# Patient Record
Sex: MALE | Race: BLACK OR AFRICAN AMERICAN
[De-identification: names, ages, dates, MRNs, and addresses within clinical notes are randomized per-mention and may not be internally consistent; named-entity substitution may affect disease eponyms.]

---

## 2017-02-20 ENCOUNTER — HOSPITAL ENCOUNTER (EMERGENCY)
Dept: HOSPITAL 68 - ERH | Age: 42
End: 2017-02-20
Payer: COMMERCIAL

## 2017-02-20 VITALS — DIASTOLIC BLOOD PRESSURE: 80 MMHG | SYSTOLIC BLOOD PRESSURE: 160 MMHG

## 2017-02-20 VITALS — WEIGHT: 250 LBS | BODY MASS INDEX: 35.79 KG/M2 | HEIGHT: 70 IN

## 2017-02-20 DIAGNOSIS — I10: ICD-10-CM

## 2017-02-20 DIAGNOSIS — R51: ICD-10-CM

## 2017-02-20 DIAGNOSIS — H60.91: Primary | ICD-10-CM

## 2017-02-20 DIAGNOSIS — Z72.0: ICD-10-CM

## 2017-02-20 LAB
ABSOLUTE GRANULOCYTE CT: 8.9 /CUMM (ref 1.4–6.5)
BASOPHILS # BLD: 0 /CUMM (ref 0–0.2)
BASOPHILS NFR BLD: 0.2 % (ref 0–2)
EOSINOPHIL # BLD: 0.2 /CUMM (ref 0–0.7)
EOSINOPHIL NFR BLD: 2 % (ref 0–5)
ERYTHROCYTE [DISTWIDTH] IN BLOOD BY AUTOMATED COUNT: 13.9 % (ref 11.5–14.5)
GRANULOCYTES NFR BLD: 73 % (ref 42.2–75.2)
HCT VFR BLD CALC: 45.6 % (ref 42–52)
LYMPHOCYTES # BLD: 2.5 /CUMM (ref 1.2–3.4)
MCH RBC QN AUTO: 26.2 PG (ref 27–31)
MCHC RBC AUTO-ENTMCNC: 33.3 G/DL (ref 33–37)
MCV RBC AUTO: 78.8 FL (ref 80–94)
MONOCYTES # BLD: 0.5 /CUMM (ref 0.1–0.6)
PLATELET # BLD: 346 /CUMM (ref 130–400)
PMV BLD AUTO: 7.6 FL (ref 7.4–10.4)
RED BLOOD CELL CT: 5.78 /CUMM (ref 4.7–6.1)
WBC # BLD AUTO: 12.2 /CUMM (ref 4.8–10.8)

## 2017-02-20 NOTE — CT SCAN REPORT
EXAMINATION:
CT HEAD WITHOUT CONTRAST
 
CLINICAL INFORMATION:
Headache, hypertension
 
COMPARISON:
None
 
TECHNIQUE:
Contiguous axial imaging was performed from the skull base to vertex without
intravenous administration of contrast.
 
DLP:
672 mGy-cm
 
FINDINGS:
There is no evidence of acute intracranial hemorrhage or territorial
infarction. No abnormal mass effect or midline shift is seen. Gray to white
matter differentiation is well preserved. No extra-axial fluid collections
are identified.
 
The ventricles are normal in size. There is no abnormal attenuation within
the brain parenchyma. The osseous structures and soft tissues are normal.
Partial opacification of the right ethmoid sinuses.
 
IMPRESSION:
No acute intracranial pathology. Partial opacification of the right ethmoid
sinus.

## 2017-02-20 NOTE — ED GENERAL ADULT
History of Present Illness
 
General
Chief Complaint: General Adult
Stated Complaint: ELAVATED BP
Source: patient
Exam Limitations: no limitations
 
Vital Signs & Intake/Output
Vital Signs & Intake/Output
 Vital Signs
 
 
Date Time Temp Pulse Resp B/P Pulse O2 O2 Flow FiO2
 
     Ox Delivery Rate 
 
 1653    160/80    
 
 1624    202/122    
 
 1600 99.0 103 20 198/116    
 
 1555    198/116    
 
 1516  103 20 200/110 97 Room Air  
 
 1436 99.0 97 20 198/122    
 
 1418    198/122    
 
 1324 99.0 97 20 196/135 97 Room Air  
 
 
Allergies
Coded Allergies:
ibuprofen (From MOTRIN) (Intermediate, HIVES 16)
 
Reconcile Medications
Albuterol Sulfate (Proair Hfa) 90 MCG HFA.AER.AD   2 PUF INH Q4-6 PRN PRN 
SHORTNESS OF BREATH  (Reported)
Ciprofloxacin HCl/Dexameth (Ciprodex Otic Suspension) 0.3 %-0.1 % DROPS.SUSP   4
GTT OT BID otitis externa
     use for 10 days
Hydrochlorothiazide 25 MG TABLET   1 TAB PO DAILY BP  (Reported)
Labetalol HCl 100 MG TABLET   1 TAB PO BID hypertension
Oxycodone HCl/Acetaminophen (Percocet 5-325 MG Tablet) 5 MG-325 MG TABLET   1 
TAB PO Q6H PRN PAIN
 
Triage Note:
WENT TO WALK IN CLINIC FOR EAR INFECTION AND SENT
 HERE FOR BP /180. PT STATES + H/A. PT TAKES
 HCTZ FOR BP AND TOOK MED TODAY
Triage Nurses Notes Reviewed? yes
HPI:
Patient is a 42 year old male presents for evaluation of elevated blood 
pressure.  Patient was at an urgent care clinic for evaluation of right ear pain
and they obtained a blood pressure of 209/180.  Patient takes 
hydrochlorothiazide for his blood pressure, has been compliant with his 
medication regimen.  Headache and ear pain since yesterday.  Patient also noted 
drainage from his right ear over the past 24 hours.  Patient took Benadryl 
yesterday evening to help him sleep with minimal improvement.  Patient has not 
taken any medication for his pain.  Headache and ear pain is currently severe.  
Patient denies blurred vision, chest pain, dyspnea nausea, vomiting, fevers, 
chills.
(ODALYS DONG,KISHAN)
 
Past History
 
Travel History
Traveled to [place] past 21 day No
 
Medical History
Any Pertinent Medical History? see below for history
Neurological: NONE
EENT: NONE
Cardiovascular: hypertension
Respiratory: asthma
Gastrointestinal: NONE
Hepatic: NONE
Renal: NONE
Musculoskeletal: NECK LYPOMA
Psychiatric: NONE
Endocrine: NONE
 
Surgical History
Surgical History: non-contributory
 
Psychosocial History
What is your primary language English
Tobacco Use: Current Daily Use
Daily Tobacco Use Amount/Type: => 5 Cigarettes daily
ETOH Use: occasional use
Illicit Drug Use: denies illicit drug use
 
Family History
Hx Contributory? No
(KISHAN COLBY)
 
Review of Systems
 
Review of Systems
Constitutional:
Denies: chills, fever. 
EENTM:
Reports: ear pain, nasal congestion.  Denies: blurred vision, visual changes. 
Respiratory:
Denies: cough, short of breath. 
Cardiovascular:
Denies: chest pain, peripheral edema, syncope. 
GI:
Denies: abdominal pain, nausea, vomiting. 
Genitourinary:
Reports: no symptoms. 
Musculoskeletal:
Reports: no symptoms. 
Skin:
Reports: no symptoms. 
Neurological/Psychological:
Reports: headache.  Denies: numbness. 
Hematologic/Endocrine:
Denies: bruising, bleeding. 
Immunologic/Allergic:
Denies: splenectomy. 
(KISHAN COLBY)
 
Physical Exam
 
Physical Exam
General Appearance: well developed/nourished, alert, awake
Head: atraumatic, normal appearance
Eyes:
Bilateral: normal appearance, PERRL, EOMI, other (grossly normal fundoscope exam
). 
Ears, Nose, Throat: inflammation of the right EAC.  Right tragus tenderness
Neck: normal inspection, supple, full range of motion
Respiratory: normal breath sounds, chest non-tender, no respiratory distress, 
lungs clear
Cardiovascular: regular rate/rhythm (no appreciable murmur)
Gastrointestinal: soft, non-tender
Back: normal inspection, normal range of motion
Extremities: normal inspection, normal capillary refill, normal range of motion,
no edema
Neurologic/Psych: no motor/sensory deficits, awake, alert, oriented x 3, normal 
gait, CNs II-XII nml as tested
Skin: intact, normal color, warm/dry
Lymphatic: no anterior cervical gallo
 
Core Measures
ACS in differential dx? Yes
ASA ordered for poss ACS? No-ACS ruled out
CVA/TIA Diagnosis: No
Severe Sepsis Present: No
Septic Shock Present: No
(KISHAN COLBY)
 
Progress
Differential Diagnoses
I considered the following diagnoses in my evaluation of the patient: Sinusitis,
otitis media, otitis externa, intracranial bleed, intracranial infection, 
hypertensive urgency, hypertensive crisis
 
Plan of Care:
 Orders
 
 
Procedure Date/time Status
 
Telemetry/Cardiac Monitor 1423 Active
 
TROPONIN LEVEL 1423 Complete
 
COMPREHENSIVE METABOLIC PANEL 1423 Complete
 
CBC WITHOUT DIFFERENTIAL 1423 Complete
 
EKG  140 Active
 
 
 Laboratory Tests
 
 
 
17 1425:
Anion Gap 10, Estimated GFR > 60, BUN/Creatinine Ratio 7.5, Glucose 111  H, 
Calcium 9.2, Total Bilirubin 0.5, AST 24, ALT 39, Alkaline Phosphatase 90, 
Troponin I < 0.01, Total Protein 7.7, Albumin 4.2, Globulin 3.5, Albumin/
Globulin Ratio 1.2, CBC w Diff NO MAN DIFF REQ, RBC 5.78, MCV 78.8  L, MCH 26.2 
L, RDW 13.9, MPV 7.6, Gran % 73.0, Lymphocytes % 20.7, Monocytes % 4.1, 
Eosinophils % 2.0, Basophils % 0.2, Absolute Granulocytes 8.9  H, Absolute 
Lymphocytes 2.5, Absolute Monocytes 0.5, Absolute Eosinophils 0.2, Absolute 
Basophils 0, PUBS MCHC 33.3
2017 4:28:20 PM: Patient's blood pressure did not significantly improve 
after amlodipine.  IV labetalol ordered.  Discussed with Dr. Wagner, EKG and labs
reviewed: If blood pressure improves to less than 200/100 then can discharge 
home on additional PO medication.
 
 
Blood pressure improving.  No acute findings of end organ dysfunction on labs or
exam.  Appears stable for discharge with close outpatient follow up.
(ODALYS DONG,KISHAN)
Diagnostic Imaging:
Viewed by Me: CT Scan.  Discussed w/RAD: CT Scan. 
Radiology Impression: PATIENT: GERRY DOVE  MEDICAL RECORD NO: [id number] PRESENT
AGE: 42  PATIENT ACCOUNT NO: [id number] : 77  LOCATION: [facility] ORDERING 
PHYSICIAN: KISHAN DONG     SERVICE DATE: 17- EXAM TYPE: CAT -
CT HEAD WO IV CONTRAST EXAMINATION: CT HEAD WITHOUT CONTRAST CLINICAL 
INFORMATION: Headache, hypertension COMPARISON: None TECHNIQUE: Contiguous axial
imaging was performed from the skull base to vertex without intravenous 
administration of contrast. DLP: 672 mGy-cm FINDINGS: There is no evidence of 
acute intracranial hemorrhage or territorial infarction. No abnormal mass effect
or midline shift is seen. Gray to white matter differentiation is well 
preserved. No extra-axial fluid collections are identified. The ventricles are 
normal in size. There is no abnormal attenuation within the brain parenchyma. 
The osseous structures and soft tissues are normal. Partial opacification of the
right ethmoid sinuses. IMPRESSION: No acute intracranial pathology. Partial 
opacification of the right ethmoid sinus. DICTATED BY: MADELINE LINDSAY MD  
DATE/TIME DICTATED:17 :RAD.PEREZ  DATE/TIME 
TRANSCRIBED:17 CONFIDENTIAL, DO NOT COPY WITHOUT APPROPRIATE 
AUTHORIZATION.  <Electronically signed in Other Vendor System>                  
                                                                     SIGNED BY: 
MADELINE LINDSAY MD 17 1523
Initial ED EKG: normal axis, normal intervals, normal p-waves, normal QRS 
complex, normal sinus rhythm, no ST T wave changes
(KISHAN COLBY)
 
Departure
 
Departure
Time of Disposition: 
Disposition: HOME OR SELF CARE
Condition: Stable
Clinical Impression
Primary Impression: Otitis externa
Qualifiers:  Otitis externa type: unspecified type Laterality: right Chronicity:
acute Qualified Code: H60.501 - Unspecified acute noninfective otitis externa, 
right ear
Secondary Impressions: 
Hypertension
Qualifiers:  Hypertension type: essential hypertension Qualified Code: I10 - 
Essential (primary) hypertension
 
Referrals:
YAZAN MATAMOROS,CLIVE MONTERROSO (PCP/Family)
 
Additional Instructions:
Follow up with your primary doctor this week for further evaluation.  Call in 
the morning for appointment.  Return to the emergency department if he developed
chest pain, difficulty breathing, change in vision, numbness, weakness, or 
worsening of symptoms.
Departure Forms:
Customer Survey
General Discharge Information
Prescriptions:
Current Visit Scripts
Oxycodone HCl/Acetaminophen (Percocet 5-325 MG Tablet) 1 TAB PO Q6H PRN PAIN
     #10 TAB 
 
Ciprofloxacin HCl/Dexameth (Ciprodex Otic Suspension) 4 GTT OT BID 
     #1 BOT 
     use for 10 days
 
Labetalol HCl 1 TAB PO BID 
     #60 TAB 
 
 
(KISHAN COLBY)
 
PA/NP Co-Sign Statement
Statement:
ED Attending supervision documentation-
 
[] I saw and evaluated the patient. I have also reviewed all the pertinent lab 
results and diagnostic results. I agree with the findings and the plan of care 
as documented in the PA's/NP's documentation. 
 
[x] I have reviewed the ED Record and agree with the PA's/NP's documentation.
 
[] Additions or exceptions (if any) to the PAs/NP's note and plan are 
summarized below:
[]
 
(MONICA WAGNER DO)
 
Critical Care Note
 
Critical Care Note
Critical Care Time: non-applicable
(ODALYS DONG,KISHAN)

## 2018-08-09 ENCOUNTER — HOSPITAL ENCOUNTER (EMERGENCY)
Dept: HOSPITAL 68 - ERH | Age: 43
Discharge: LEFT BEFORE DISCHARGE | End: 2018-08-09
Payer: COMMERCIAL

## 2018-08-09 VITALS — BODY MASS INDEX: 30.92 KG/M2 | HEIGHT: 70 IN | WEIGHT: 216 LBS

## 2018-08-09 VITALS — SYSTOLIC BLOOD PRESSURE: 135 MMHG | DIASTOLIC BLOOD PRESSURE: 75 MMHG

## 2018-08-09 DIAGNOSIS — M79.601: Primary | ICD-10-CM

## 2018-08-09 LAB
ABSOLUTE GRANULOCYTE CT: 5.1 /CUMM (ref 1.4–6.5)
BASOPHILS # BLD: 0 /CUMM (ref 0–0.2)
BASOPHILS NFR BLD: 0.4 % (ref 0–2)
EOSINOPHIL # BLD: 0.4 /CUMM (ref 0–0.7)
EOSINOPHIL NFR BLD: 4.2 % (ref 0–5)
ERYTHROCYTE [DISTWIDTH] IN BLOOD BY AUTOMATED COUNT: 15.2 % (ref 11.5–14.5)
GRANULOCYTES NFR BLD: 59.2 % (ref 42.2–75.2)
HCT VFR BLD CALC: 38.9 % (ref 42–52)
LYMPHOCYTES # BLD: 2.7 /CUMM (ref 1.2–3.4)
MCH RBC QN AUTO: 26.9 PG (ref 27–31)
MCHC RBC AUTO-ENTMCNC: 33.1 G/DL (ref 33–37)
MCV RBC AUTO: 81.1 FL (ref 80–94)
MONOCYTES # BLD: 0.5 /CUMM (ref 0.1–0.6)
PLATELET # BLD: 362 /CUMM (ref 130–400)
PMV BLD AUTO: 7.3 FL (ref 7.4–10.4)
RED BLOOD CELL CT: 4.8 /CUMM (ref 4.7–6.1)
WBC # BLD AUTO: 8.6 /CUMM (ref 4.8–10.8)

## 2018-08-09 NOTE — RADIOLOGY REPORT
EXAMINATION:
XR PORTABLE CHEST
 
CLINICAL INFORMATION:
Chest pain
 
COMPARISON:
6/6/2018
 
TECHNIQUE:
Portable frontal view of the chest was obtained.
 
FINDINGS:
The lungs are well expanded. There is no focal consolidation, edema, or
effusion. No pneumothorax. The cardiomediastinal silhouette is within normal
limits. No acute osseous abnormality.
 
IMPRESSION:
No acute pulmonary findings.

## 2021-07-27 NOTE — ED GENERAL ADULT
History of Present Illness
 
General
Chief Complaint: General Adult
Stated Complaint: "PAIN UP AND DOWN LT ARM"
Source: patient
Exam Limitations: no limitations
 
Vital Signs & Intake/Output
Vital Signs & Intake/Output
 Vital Signs
 
 
Date Time Temp Pulse Resp B/P B/P Pulse O2 O2 Flow FiO2
 
     Mean Ox Delivery Rate 
 
 2330 98.1 88 18 135/75  98 Room Air  
 
 2301       Room Air  
 
 2144 96.8 88 20 155/103  98 Room Air  
 
 
 ED Intake and Output
 
 
 08/10 0000  1200
 
Intake Total  
 
Output Total  
 
Balance  
 
   
 
Patient 216 lb 
 
Weight  
 
Weight Reported by Patient 
 
Measurement  
 
Method  
 
 
 
Allergies
Coded Allergies:
ibuprofen (From MOTRIN) (Intermediate, HIVES 18)
 
Reconcile Medications
Albuterol Sulfate (Proair Hfa) 90 MCG HFA.AER.AD   2 PUF INH Q4-6 PRN PRN 
SHORTNESS OF BREATH  (Reported)
Amlodipine Besylate (Norvasc) 10 MG TABLET   1 TAB PO DAILY HIGH BLOOD PRESSURE
Hydrochlorothiazide 25 MG TABLET   1 TAB PO DAILY BP
 
Triage Note:
PT HERE WITH C/O RIGHT ARM PAIN THAT BEGAN TODAY.
PT DENIES TRAUMA TO AFFECTED AREA. PT REPORTS " IT
FEELS CONSTRICTED". +CMS+PULSES TO AREA.
Triage Nurses Notes Reviewed? yes
Onset: Abrupt
Duration: hour(s): (2)
Timing: single episode today
Injury Environment: home
Severity: mild, moderate
Severity Numbers: 5
No Modifying Factors: none
Modifying Factors:
Worsens With: movement. 
HPI:
43-year-old male history of hypertension presented for evaluation of pain in his
right arm.  Patient states 2 hours prior to arrival he developed pain located 
mostly in the the right bicep area.  The pain started while he was at rest.  He 
reports associated numbness and tingling in the right forearm.  There is no 
trauma or triggering event is never had this before no chest pain or shortness 
of breath.  Since the symptoms first started his pain has improved currently as 
a 5 out of 10.  The pain is worse with movement or touching the area.  No recent
surgery recent IV access to that arm.  No history of DVT.  No sweats or chills 
nausea or vomiting.  He is not taking any medicine for the pain.  No swelling or
redness no fever.
(Nehemias Bowen)
 
Past History
 
Travel History
Traveled to [place] past 21 day No
 
Medical History
Any Pertinent Medical History? see below for history
Neurological: NONE
EENT: NONE
Cardiovascular: hypertension
Respiratory: asthma
Gastrointestinal: NONE
Hepatic: NONE
Renal: NONE
Musculoskeletal: NECK LYPOMA
Psychiatric: NONE
Endocrine: NONE
Blood Disorders: NONE
Cancer(s): NONE
GYN/Reproductive: NONE
 
Surgical History
Surgical History: non-contributory
 
Psychosocial History
What is your primary language English
Tobacco Use: Current Daily Use
Daily Tobacco Use Amount/Type: => 5 Cigarettes daily
ETOH Use: denies use
Illicit Drug Use: denies illicit drug use
 
Family History
Hx Contributory? No
(Nehemias Bowen)
 
Review of Systems
 
Review of Systems
Constitutional:
Reports: no symptoms. 
EENTM:
Reports: no symptoms. 
Respiratory:
Reports: no symptoms. 
Cardiovascular:
Reports: no symptoms. 
GI:
Reports: no symptoms. 
Genitourinary:
Reports: no symptoms. 
Musculoskeletal:
Reports: joint pain, muscle pain, muscle stiffness. 
Skin:
Reports: no symptoms. 
Neurological/Psychological:
Reports: numbness, tingling. 
Hematologic/Endocrine:
Reports: no symptoms. 
Immunologic/Allergic:
Reports: no symptoms. 
All Other Systems: Reviewed and Negative
(Nehemias Bowen)
 
Physical Exam
 
Physical Exam
General Appearance: well developed/nourished, no apparent distress, alert, awake
Head: atraumatic, normal appearance
Eyes:
Bilateral: normal appearance, EOMI. 
Ears, Nose, Throat: hearing grossly normal
Neck: normal inspection, supple, full range of motion
Respiratory: normal breath sounds, chest non-tender, no respiratory distress, 
lungs clear
Cardiovascular: regular rate/rhythm, normal peripheral pulses
Peripheral Pulses:
2+ brachial (R), 2+ brachial (L), 2+ radial (R), 2+ radial (L)
Gastrointestinal: soft, non-tender
Back: normal inspection, normal range of motion
Extremities: normal inspection, normal range of motion, no edema, Scalp 
laceration there is a 1 cm linear superficial laceration, abrasion there is mild
tenderness to palpation in the area of the right bicep.  No bruising swelling or
abrasions.  No erythema.  Brachial and radial pulses 2+
Neurologic/Psych: no motor/sensory deficits, awake, alert, oriented x 3, normal 
gait, normal mood/affect
Skin: intact, normal color, warm/dry
Lymphatic: no anterior cervical gallo
 
Core Measures
ACS in differential dx? No
CVA/TIA Diagnosis: No
Sepsis Present: No
Sepsis Focused Exam Completed? No
(Nehemias Bowen)
 
Progress
Differential Diagnoses
I considered the following diagnoses in my evaluation of the patient: [Muscle 
strain, tendinitis, tendon rupture, acute coronary syndrome, cubital tunnel 
syndrome, carpal tunnel syndrome, brachial plexus injury, cervical radiculopathy
, dvt]
 
Plan of Care:
 Orders
 
 
Procedure Date/time Status
 
TROPONIN LEVEL 2225 Complete
 
COMPREHENSIVE METABOLIC PANEL 2225 Complete
 
CBC WITHOUT DIFFERENTIAL 2225 Complete
 
EKG 2146 Active
 
 
 Laboratory Tests
 
 
 
18:
Anion Gap 6, Estimated GFR > 60, BUN/Creatinine Ratio 10.0, Glucose 107  H, 
Calcium 9.1, Total Bilirubin 0.3, AST 24, ALT 41, Alkaline Phosphatase 80, 
Troponin I < 0.01, Total Protein 7.0, Albumin 3.7, Globulin 3.3, Albumin/
Globulin Ratio 1.1, CBC w Diff NO MAN DIFF REQ, RBC 4.80, MCV 81.1, MCH 26.9  L,
MCHC 33.1, RDW 15.2  H, MPV 7.3  L, Gran % 59.2, Lymphocytes % 30.9, Monocytes %
5.3, Eosinophils % 4.2, Basophils % 0.4, Absolute Granulocytes 5.1, Absolute 
Lymphocytes 2.7, Absolute Monocytes 0.5, Absolute Eosinophils 0.4, Absolute 
Basophils 0
 
 
Patient is here for evaluation of acute onset pain to his right bicep area.  On 
exam there is no gross deformity bruising swelling abrasions or erythema.  
Neurovascular supplies intact.  There was no trauma or triggering events.  He 
has no chest pain or shortness of breath.  The pain is improved since first 
starting.  Patient is hypertensive.  Labs EKG chest x-ray ordered.
 
Patient walked out of the emergency department before his evaluation was 
complete. IT Is unclear why he did this.  Patient was alert and oriented 3.  
Blood work chest x-ray was unremarkable.  Attempted to call patient multiple 
times but he did not answer.
Diagnostic Imaging:
Viewed by Me: Radiology Read.  Discussed w/RAD: Radiology Read. 
Radiology Impression: PATIENT: GERRY DOVE  MEDICAL RECORD NO: [id number] PRESENT
AGE: 43  PATIENT ACCOUNT NO: [id number] : 77  LOCATION: [facility] ORDERING 
PHYSICIAN: Nehemias DONG     SERVICE DATE:  EXAM TYPE: RAD - XRY-
PORTABLE CHEST XRAY EXAMINATION: XR PORTABLE CHEST CLINICAL INFORMATION: Chest 
pain COMPARISON: 2018 TECHNIQUE: Portable frontal view of the chest was 
obtained. FINDINGS: The lungs are well expanded. There is no focal consolidation
, edema, or effusion. No pneumothorax. The cardiomediastinal silhouette is 
within normal limits. No acute osseous abnormality. IMPRESSION: No acute 
pulmonary findings. DICTATED BY: Alfonzo Berman MD  DATE/TIME DICTATED:2332 :RAD.PEREZ  DATE/TIME TRANSCRIBED:18 
CONFIDENTIAL, DO NOT COPY WITHOUT APPROPRIATE AUTHORIZATION.  <Electronically 
signed in Other Vendor System>                                                  
                                     SIGNED BY: Cullen MATAMOROS,Alfonzo 18
Initial ED EKG: normal sinus rhythm, nonspecific ST T wave chg
(Nehemias Bowen)
 
Departure
 
Departure
Disposition: LEFT AGAINST MEDICAL ADVICE
Condition: Stable
Clinical Impression
Primary Impression: Right arm pain
Referrals:
Caleb MATAMOROS,Becca MONTERROSO (PCP/Family)
 
Departure Forms:
Customer Survey
General Discharge Information
(Nehemias Bowen)
 
PA/NP Co-Sign Statement
Statement:
ED Attending supervision documentation-
 
 I saw and evaluated the patient. I have also reviewed all the pertinent lab 
results and diagnostic results. I agree with the findings and the plan of care 
as documented in the PA's/NP's documentation. 
 
x I have reviewed the ED Record and agree with the PA's/NP's documentation.
 
[] Additions or exceptions (if any) to the PAs/NP's note and plan are 
summarized below:
[]
 
(Ajith MATAMOROS,Eros)
 
Critical Care Note
 
Critical Care Note
Critical Care Time: non-applicable
(Black PA,Nehemias)
Yes - the patient is able to be screened